# Patient Record
Sex: FEMALE | Race: BLACK OR AFRICAN AMERICAN | Employment: FULL TIME | URBAN - METROPOLITAN AREA
[De-identification: names, ages, dates, MRNs, and addresses within clinical notes are randomized per-mention and may not be internally consistent; named-entity substitution may affect disease eponyms.]

---

## 2017-11-08 ENCOUNTER — TELEPHONE (OUTPATIENT)
Dept: VASCULAR SURGERY | Age: 62
End: 2017-11-08

## 2017-11-08 NOTE — TELEPHONE ENCOUNTER
Patient has cancelled her study for 11/09 due to transportation issues, she wasn't ready to reschedule at this time. I did inform the patient that her follow is scheduled for 11/27 and we need her to have that ultra sound before then, she agreed and will call as soon as she can arrange for a ride.

## 2017-11-20 ENCOUNTER — OFFICE VISIT (OUTPATIENT)
Dept: VASCULAR SURGERY | Age: 62
End: 2017-11-20

## 2017-11-20 DIAGNOSIS — I70.211 ATHEROSCLEROSIS OF NATIVE ARTERY OF RIGHT LOWER EXTREMITY WITH INTERMITTENT CLAUDICATION (HCC): ICD-10-CM

## 2017-11-20 NOTE — PROCEDURES
[facility]   *** FINAL REPORT ***    Name: Valorie Zepeda  MRN: [id number]       Outpatient  : 1955  HIS Order #: [id number]  [id number] [facility] Visit #: [id number]  Date: 2017    TYPE OF TEST: Peripheral Arterial Testing    REASON FOR TEST  Peripheral vascular dz NOS    Right Leg  Segmentals: Normal                     mmHg  Brachial         156  High thigh  Low thigh  Calf             152  Posterior tibial 149  Dorsalis pedis   152  Peroneal  Metatarsal  Toe pressure     124  Doppler:    Normal  Ankle/Brachial: 0.97    Left Leg  Segmentals: Abnormal                     mmHg  Brachial         148  High thigh  Low thigh  Calf             153  Posterior tibial 145  Dorsalis pedis   143  Peroneal  Metatarsal  Toe pressure     133  Doppler:    Normal  Ankle/Brachial: 0.93    INTERPRETATION/FINDINGS  Physiologic testing was performed using continuous wave doppler and  segmental pressures. 1. Perfusion on the right is within normal limits at rest.  History of   right femoral/popliteal artery stent placement. 2. Mild arterial insufficiency on the left at rest, level  undetermined. 3. CHRISTINE on the right is 0.97 and on the left is 0.93.  4. The DBI on the right is 0.79 and on the left is 0.85. ADDITIONAL COMMENTS    I have personally reviewed the data relevant to the interpretation of  this  study. TECHNOLOGIST: Salazar Mckeon RVT, BS  Signed: 2017 11:45 AM    PHYSICIAN: Kati Adhikari MD  Signed: 2017 02:50 PM

## 2017-11-27 ENCOUNTER — OFFICE VISIT (OUTPATIENT)
Dept: VASCULAR SURGERY | Age: 62
End: 2017-11-27

## 2017-11-27 VITALS
DIASTOLIC BLOOD PRESSURE: 82 MMHG | BODY MASS INDEX: 30.36 KG/M2 | HEIGHT: 62 IN | RESPIRATION RATE: 18 BRPM | SYSTOLIC BLOOD PRESSURE: 144 MMHG | HEART RATE: 70 BPM | WEIGHT: 165 LBS

## 2017-11-27 DIAGNOSIS — Z87.891 HISTORY OF TOBACCO ABUSE: ICD-10-CM

## 2017-11-27 DIAGNOSIS — I70.211 ATHEROSCLEROSIS OF NATIVE ARTERY OF RIGHT LOWER EXTREMITY WITH INTERMITTENT CLAUDICATION (HCC): Primary | ICD-10-CM

## 2017-11-27 DIAGNOSIS — M79.89 LEG SWELLING: ICD-10-CM

## 2017-11-27 NOTE — MR AVS SNAPSHOT
Visit Information Date & Time Provider Department Dept. Phone Encounter #  
 11/27/2017  9:00 AM [facility] [phone] Follow-up Instructions Return in about 1 year (around 11/27/2018). Upcoming Health Maintenance Date Due Hepatitis C Screening 1955 Pneumococcal 19-64 Medium Risk (1 of 1 - PPSV23) 6/14/1973 DTaP/Tdap/Td series (1 - Tdap) 6/14/1975 PAP AKA CERVICAL CYTOLOGY 6/14/1975 FOBT Q 1 YEAR AGE 50-75 6/14/2004 BREAST CANCER SCRN MAMMOGRAM 9/30/2013 ZOSTER VACCINE AGE 60> 4/14/2014 Influenza Age 5 to Adult 8/1/2017 Allergies as of 11/27/2017  Review Complete On: 11/27/2017 By: FAUZIA Almanza Severity Noted Reaction Type Reactions Asa-acetaminophen-caff-buffers  10/02/2014    Nausea and Vomiting Pcn [Penicillins]  10/02/2014    Hives Current Immunizations  Never Reviewed No immunizations on file. Not reviewed this visit You Were Diagnosed With   
  
 Codes Comments Atherosclerosis of native artery of right lower extremity with intermittent claudication ([facility] [address]    -  Primary ICD-10-CM: E71.019 ICD-9-CM: 440.21 Vitals BP Pulse Resp Height(growth percentile) Weight(growth percentile) BMI  
 144/82 (BP 1 Location: Left arm, BP Patient Position: Sitting) 70 18 5' 2\" (1.575 m) 165 lb (74.8 kg) 30.18 kg/m2 OB Status Smoking Status Postmenopausal Former Smoker Vitals History BMI and BSA Data Body Mass Index Body Surface Area  
 30.18 kg/m 2 1.81 m 2 Preferred Pharmacy Pharmacy Name Phone [id number] [place] Your Updated Medication List  
  
   
This list is accurate as of: 11/27/17  9:27 AM.  Always use your most recent med list.  
  
  
  
  
 aspirin 81 mg chewable tablet Take 81 mg by mouth daily. Follow-up Instructions Return in about 1 year (around 11/27/2018). To-Do List   
 11/27/2018 Imaging:  CHRISTINE [facility] SINGLE LEVEL AMB   
  
 11/27/2018 Imaging:  VAS DUPLEX LOW EXT ARTERY RIGHT AMB Introducing [facility]! Dear Gopal Fernandez: Thank you for requesting a [employer] account. Our records indicate that you already have an active [employer] account. You can access your account anytime at [email or web address] Did you know that you can access your hospital and ER discharge instructions at any time in [employer]? You can also review all of your test results from your hospital stay or ER visit. Additional Information If you have questions, please visit the Frequently Asked Questions section of the [employer] website at [email or web address] Remember, [employer] is NOT to be used for urgent needs. For medical emergencies, dial 911. Now available from your iPhone and Android! Please provide this summary of care documentation to your next provider. Your primary care clinician is listed as Ailyn Quiles. If you have any questions after today's visit, please call [phone].

## 2017-11-27 NOTE — PROGRESS NOTES
[facility]    Chief Complaint   Patient presents with    Leg Pain       History and Physical    Claudetteteressa Fisher is a 60 y.o. female with history of PAD who is here today for her annual follow-up. She is status post right SFA and popliteal artery stenting in 2015. Patient is doing very well. She has no claudication or rest pain. She does report that she has recently started to exercise and is trying to increase her activity level. She retired in August of this year and is going back to work part-time next month. No fevers chills. She does point out some swelling her legs and states that she eats a lot of salt and is trying very hard to cut back. No skin changes. No ulcers. No pain. She did have f/u studies which showed stents are patent. Perfusion on right is within normal limits. She does have mild arterial insufficiency on the left at rest. CHRISTINE on the right is 0.97 and on the left is 0.93. The DBI on the right is 0.79 and on the left is 0.85. History reviewed. No pertinent past medical history. Past Surgical History:   Procedure Laterality Date    HX CHOLECYSTECTOMY       Patient Active Problem List   Diagnosis Code    Right leg pain M79.604    Tobacco abuse Z72.0    Atherosclerosis of native artery of extremity with intermittent claudication ([facility]) I70.219    PAD (peripheral artery disease) ([facility]) I73.9     Current Outpatient Prescriptions   Medication Sig Dispense Refill    aspirin 81 mg chewable tablet Take 81 mg by mouth daily. Allergies   Allergen Reactions    Asa-Acetaminophen-Caff-Buffers Nausea and Vomiting    Pcn [Penicillins] Hives     Social History     Social History    Marital status: SINGLE     Spouse name: N/A    Number of children: N/A    Years of education: N/A     Occupational History    Not on file.      Social History Main Topics    Smoking status: Former Smoker    Smokeless tobacco: Former User     Quit date: 12/16/2014    Alcohol use No    Drug use: Not on file    Sexual activity: Not on file     Other Topics Concern    Not on file     Social History Narrative      Family History   Problem Relation Age of Onset    Stroke Other      multiple family members including sisters, mother and others       Physical Exam:    Visit Vitals    /82 (BP 1 Location: Left arm, BP Patient Position: Sitting)    Pulse 70    Resp 18    Ht 5' 2\" (1.575 m)    Wt 165 lb (74.8 kg)    BMI 30.18 kg/m2      General: Well-appearing female in no acute distress  HEENT: EOMI no scleral icterus is noted  Pulmonary: No increased work or breathing is noted  Extremities: Warm and well perfused bilaterally. 1-2+ pitting edema in BLE   Neuro: Cranial nerves II through XII are grossly intact      Impression and Plan:  Sosa Coughlin is a 60 y.o. female with history of PAD and right leg claudication. She is doing very well status post right SFA and popliteal artery stenting. She has no claudication or rest pain at this time. She quit smoking years ago. She continues on a daily aspirin. She will f/u in one year with repeat studies. Sooner as needed. Patient is understanding and agrees to plan. As far as her leg swelling is concerned I did encourage her to continue to decrease her salt intake as well as continued increase her activity level. I also discussed the role of leg elevation and compression therapy. Follow-up Disposition:  Return in about 1 year (around 11/27/2018). [address] [place], [place]    PLEASE NOTE:  This document has been produced using voice recognition software. Unrecognized errors in transcription may be present.

## 2020-01-15 ENCOUNTER — HOSPITAL ENCOUNTER (OUTPATIENT)
Dept: GENERAL RADIOLOGY | Age: 65
Discharge: HOME | End: 2020-01-15
Payer: MEDICARE

## 2020-01-15 DIAGNOSIS — M47.816 LUMBAR SPONDYLOSIS: ICD-10-CM

## 2020-01-15 PROCEDURE — 72110 X-RAY EXAM L-2 SPINE 4/>VWS: CPT

## 2020-01-20 ENCOUNTER — APPOINTMENT (OUTPATIENT)
Dept: PHYSICAL THERAPY | Age: 65
End: 2020-01-20

## 2020-11-11 ENCOUNTER — HOSPITAL ENCOUNTER (OUTPATIENT)
Dept: GENERAL RADIOLOGY | Age: 65
Discharge: HOME | End: 2020-11-11
Payer: MEDICARE

## 2020-11-11 ENCOUNTER — TRANSCRIBE ORDER (OUTPATIENT)
Dept: REGISTRATION | Age: 65
End: 2020-11-11

## 2020-11-11 DIAGNOSIS — R06.02 SHORTNESS OF BREATH: Primary | ICD-10-CM

## 2020-11-11 DIAGNOSIS — M54.16 LUMBAR RADICULOPATHY: ICD-10-CM

## 2020-11-11 DIAGNOSIS — R06.02 SHORTNESS OF BREATH: ICD-10-CM

## 2020-11-11 PROCEDURE — 71046 X-RAY EXAM CHEST 2 VIEWS: CPT

## 2020-11-11 PROCEDURE — 72110 X-RAY EXAM L-2 SPINE 4/>VWS: CPT

## 2020-11-11 PROCEDURE — 73521 X-RAY EXAM HIPS BI 2 VIEWS: CPT

## 2021-09-16 ENCOUNTER — TRANSCRIBE ORDER (OUTPATIENT)
Dept: SCHEDULING | Age: 66
End: 2021-09-16

## 2021-09-16 DIAGNOSIS — Z12.31 SCREENING MAMMOGRAM FOR HIGH-RISK PATIENT: Primary | ICD-10-CM

## 2021-09-21 ENCOUNTER — HOSPITAL ENCOUNTER (OUTPATIENT)
Dept: MAMMOGRAPHY | Age: 66
Discharge: HOME | End: 2021-09-21
Attending: FAMILY MEDICINE
Payer: MEDICARE

## 2021-09-21 DIAGNOSIS — Z12.31 SCREENING MAMMOGRAM FOR HIGH-RISK PATIENT: ICD-10-CM

## 2021-09-21 PROCEDURE — 77063 BREAST TOMOSYNTHESIS BI: CPT

## 2023-11-21 ENCOUNTER — HOSPITAL ENCOUNTER (OUTPATIENT)
Facility: HOSPITAL | Age: 68
Discharge: HOME | End: 2023-11-24
Payer: MEDICARE

## 2023-11-21 DIAGNOSIS — Z12.31 VISIT FOR SCREENING MAMMOGRAM: ICD-10-CM

## 2023-11-21 DIAGNOSIS — Z79.83 PERSONAL HISTORY OF ONGOING TREATMENT WITH ALENDRONATE (FOSAMAX): ICD-10-CM

## 2023-11-21 PROCEDURE — 77080 DXA BONE DENSITY AXIAL: CPT

## 2023-11-21 PROCEDURE — 77063 BREAST TOMOSYNTHESIS BI: CPT

## 2024-02-23 ENCOUNTER — HOSPITAL ENCOUNTER (OUTPATIENT)
Facility: HOSPITAL | Age: 69
End: 2024-02-23
Payer: MEDICARE

## 2024-02-23 DIAGNOSIS — Z87.442 HISTORY OF KIDNEY STONES: ICD-10-CM

## 2024-02-23 DIAGNOSIS — N85.2 UTERINE ENLARGEMENT: ICD-10-CM

## 2024-02-23 DIAGNOSIS — D25.9 UTERINE LEIOMYOMA, UNSPECIFIED LOCATION: ICD-10-CM

## 2024-02-23 DIAGNOSIS — N95.0 POSTMENOPAUSAL VAGINAL BLEEDING: ICD-10-CM

## 2024-02-23 PROCEDURE — 93975 VASCULAR STUDY: CPT

## 2024-02-23 PROCEDURE — 76770 US EXAM ABDO BACK WALL COMP: CPT
